# Patient Record
Sex: FEMALE | Race: WHITE
[De-identification: names, ages, dates, MRNs, and addresses within clinical notes are randomized per-mention and may not be internally consistent; named-entity substitution may affect disease eponyms.]

---

## 2014-07-07 VITALS — SYSTOLIC BLOOD PRESSURE: 140 MMHG | DIASTOLIC BLOOD PRESSURE: 77 MMHG

## 2017-09-20 ENCOUNTER — HOSPITAL ENCOUNTER (OUTPATIENT)
Dept: HOSPITAL 44 - INF | Age: 67
End: 2017-09-20
Attending: NURSE PRACTITIONER
Payer: MEDICARE

## 2017-09-20 DIAGNOSIS — M81.0: Primary | ICD-10-CM

## 2017-09-20 PROCEDURE — 96372 THER/PROPH/DIAG INJ SC/IM: CPT

## 2017-12-31 ENCOUNTER — HOSPITAL ENCOUNTER (EMERGENCY)
Dept: HOSPITAL 44 - ED | Age: 67
Discharge: HOME | End: 2017-12-31
Payer: MEDICARE

## 2017-12-31 VITALS — DIASTOLIC BLOOD PRESSURE: 80 MMHG | SYSTOLIC BLOOD PRESSURE: 136 MMHG

## 2017-12-31 DIAGNOSIS — J11.1: Primary | ICD-10-CM

## 2017-12-31 PROCEDURE — 87400 INFLUENZA A/B EACH AG IA: CPT

## 2017-12-31 PROCEDURE — 99282 EMERGENCY DEPT VISIT SF MDM: CPT

## 2017-12-31 NOTE — ED PHYSICIAN DOCUMENTATION
Upper Respiratory Symptoms





- HISTORIAN


Historian: patient





- HPI


Chief Complaint: Upper Respiratory Symptoms


Onset: days ago (christmas)


Associated Symptoms: chills, runny nose


Further Comments: yes (67 year old patient presents with complaint of ear aches 

and intermittent nausea since Christmas.  Patient reports grandchildren had 

"bronchitis". States Friday afternoon she began having cough, body aches and 

chills.)





- ROS


CONST/EYES: denies: weakness, eye redness, eye itching, other


CVS/RESP: none


LYMPH: denies: leg swelling, rash, swollen glands, ankle swelling, other


GI/: none


NEURO/PSYCH: denies: fainting, dizziness, confusion, anxiety, depression, other


MS/SKIN: denies: joint pain, muscle aches, rash, other





- PAST HX


Lung Disease: none


PE Risk Factors: hypertension


Allergies/Adverse Reactions: 


 Allergies











Allergy/AdvReac Type Severity Reaction Status Date / Time


 


No Known Allergies Allergy   Verified 12/31/17 11:09














Home Medications: 


 Ambulatory Orders











 Medication  Instructions  Recorded


 


Dexlansoprazole [Dexilant]  12/31/17














- SOCIAL HX


Smoking History: non-smoker





- FAMILY HX


Family History: none





- VITAL SIGNS


Vital Signs: 


 Vital Signs











Temp Pulse Resp BP Pulse Ox


 


 99 F   107 H  16   166/79   95 


 


 12/31/17 10:20  12/31/17 10:20  12/31/17 10:20  12/31/17 10:20  12/31/17 10:20














- REVIEWED ASSESSMENTS


Nursing Assessment  Reviewed: Yes


Vitals Reviewed: Yes





Progress





- Progress


Progress: 





Educated patient on cause of bronchits - viral illness.  Explained antibiotics 

would not help a viral illness. 





Will swab for influenza. 





Influenza B positive - reviewed discharge instructions and medications with 

patient, verbalized understanding. 





ED Results Lab/Radiology





- Lab Results


Lab Results: 


 Lab Results











  12/31/17





  11:05


 


Influenza A (Rapid)  Negative 





   (NEGATIVE) 


 


Influenza B (Rapid)  Positive  H 





   (NEGATIVE) 














- Orders


Orders: 


 ED Orders











 Category Date Time Status


 


 Place IV Lock 1T Care  12/31/17 10:33 Inactive


 


 INFLUENZA A&B Stat Lab  12/31/17 11:05 Completed


 


 0.9 % Sodium Chloride [Normal Saline] 1,000 ml Med  12/31/17 10:33 Discontinued





 IV NOW   


 


 Acetaminophen [Tylenol Extra Strength] Med  12/31/17 11:11 Discontinued





 1,000 mg PO NOW ONE   


 


 Benzonatate [Tessalon] Med  12/31/17 11:11 Discontinued





 200 mg PO NOW ONE   


 


 Ibuprofen [Advil] Med  12/31/17 11:11 Discontinued





 600 mg PO NOW ONE   


 


 Ondansetron HCl/Pf [Zofran 4 mg/2 ml] Med  12/31/17 10:33 Discontinued





 4 mg IVP NOW ONE   














Upper Respiratory Symptoms





- EXAM


General Appearance: mild distress


EENT: eyes nml inspection, nml ENT inspection, lids & conjunct. nml, PERRL, ear 

nml, rhinorrhea, pharynx nml, airway nml.  No: pain over sinuses, frontal, 

maxillary, ethmoid


Respiratory: no resp. distress, breath sounds nml, no pain on inspiration, 

speaks full sentences, no pleuritic chest pain


Abdomen: non-tender, no organomegaly, nml bowel sounds, no distention


CVS: reg rate & rhythm, heart sounds normal, equal pulses, no murmur, no gallop

, PMI nml, no JVD, no friction rub, 24


Skin: color nml, no rash, warm,dry


Extremities: non-tender, normal range of motion, no evidence of injury, no edema

, J, NP


Neuro/Psych: oriented x3, neuro intact, mood/affect nml, CN's nml as tested





Discharge


Clincal Impression: 


 Influenza B





Referrals: 


Cierra Moreno PRN [Primary Care Provider] - 2 Days


Additional Instructions: 


Pick  up your prescriptions and start them today.





A virus cannot be treated with antibiotics.





 Rest  Have plenty of sleep and rest. Stay away from others while you have a 

cold or flu.


 Take simple painkillers  Such as Tylenol or ibuprofen, to help relieve 

headaches, muscles aches and pains and fever.  


 Keep hydrated (drink plenty of fluids)  This will help keep your throat moist 

and replace fluid lost due to a fever and sweating. Plenty of water is best. 

Avoid caffeine and alcohol as they will make you more dehydrated.


 Eat soft food  If you have a sore throat soft foods are easier to swallow. 

Foods such as chicken soup may help a sore throat and reduce mucous (sticky 

fluid).


  an over the counter decongestant such as pseudoped, dayquil and Nyquil 

at your pharmacy. You may want to try Vicks rub on your chest and/or feet.  (

Caution: Dayquil and Nyquil contain 325mg of Tylenol/acetaminophen per 

tablespoon)





Cough drops as needed for cough and sore throat. 





Increase your fluid intake  juices, hot tea, non-caffeinated beverages





Vitamin C  may be helpful in decreasing the length of your cold.  





Use a humidifier in the room where you sleep.  You can also sit in a steam 

filled bathroom 1-2 times a day.  





Tylenol every 4 hours 650mg -1000mg (do not exceed 4000mg in 24 hours) as 

needed for fever, pain and body aches.  





Alternate with Ibuprofen





Ibuprofen 600-800mg every 6 hours as needed for fever, pain and body aches.





See your primary care doctor if your symptoms become worse or do not improve in 

the next 3-4 days.


Condition: Stable


Disposition: 01 HOME, SELF-CARE


Decision to Admit: NO


Decision Time: 11:18

## 2019-07-01 ENCOUNTER — HOSPITAL ENCOUNTER (EMERGENCY)
Dept: HOSPITAL 44 - ED | Age: 69
Discharge: HOME | End: 2019-07-01
Payer: MEDICARE

## 2019-07-01 VITALS
DIASTOLIC BLOOD PRESSURE: 97 MMHG | SYSTOLIC BLOOD PRESSURE: 155 MMHG | SYSTOLIC BLOOD PRESSURE: 155 MMHG | DIASTOLIC BLOOD PRESSURE: 97 MMHG

## 2019-07-01 DIAGNOSIS — K44.9: Primary | ICD-10-CM

## 2019-07-01 DIAGNOSIS — N20.0: ICD-10-CM

## 2019-07-01 LAB
BASOPHILS NFR BLD: 0.4 % (ref 0–1.5)
EGFR (NON-AFRICAN): > 60
MCV RBC AUTO: 90 FL (ref 80–100)
NEUTROPHILS #: 4.1 # K/UL (ref 1.4–7.7)

## 2019-07-01 PROCEDURE — 81002 URINALYSIS NONAUTO W/O SCOPE: CPT

## 2019-07-01 PROCEDURE — 96372 THER/PROPH/DIAG INJ SC/IM: CPT

## 2019-07-01 PROCEDURE — S1016 NON-PVC INTRAVENOUS ADMINIST: HCPCS

## 2019-07-01 PROCEDURE — 99283 EMERGENCY DEPT VISIT LOW MDM: CPT

## 2019-07-01 PROCEDURE — 85025 COMPLETE CBC W/AUTO DIFF WBC: CPT

## 2019-07-01 PROCEDURE — 99284 EMERGENCY DEPT VISIT MOD MDM: CPT

## 2019-07-01 PROCEDURE — 74176 CT ABD & PELVIS W/O CONTRAST: CPT

## 2019-07-01 PROCEDURE — 80053 COMPREHEN METABOLIC PANEL: CPT

## 2019-07-01 NOTE — ED PHYSICIAN DOCUMENTATION
Flank Pain





- HISTORIAN


Historian: patient





- HPI


Stated Complaint: L flank pain, Nausea


Chief Complaint: Flank Pain


Onset: days ago (2)


Duration: constant


Timing: worse


Context: denies: out of country travel, bad food, recent trauma


Severity: severe


Quality: pain


Associated Symptoms: nausea


Exacerbated by: nothing


Relieved by: nothing


Further Comments: yes (69 year old female patient presents with left flank pain 

x 2 days accompanied by nausea.  Patient reports recent UTI and remote history 

of kidney stone.  Reports pain radiating to "side.")





- ROS


CONST: recent illness (UTI)


GI/: none


CVS/RESP: none


EYES/ENT: none


MS/SKIN/LYMPH: none


NEURO/PSYCH: none





- SOCIAL HX


Smoking History: non-smoker





- FAMILY HX


Family History: denies: none





- PAST HX


Past History: kidney stones, other (GERD)


Other History: hypertension, other (hypothyroid, GERD)


Surgeries/Procedures: other (1970 - left ureter; stone removal.)


Allergies: NKDA





- VITAL SIGNS


Vital Signs: 





                                   Vital Signs











Temp Pulse Resp BP Pulse Ox


 


 98.5 F   74   16   197/95   95 


 


 07/01/19 17:41  07/01/19 17:41  07/01/19 17:41  07/01/19 17:41  07/01/19 17:41














- REVIEWED ASSESSMENTS


Nursing Assessment  Reviewed: Yes


Vitals Reviewed: Yes





Progress





- Progress


Progress: 





Patient states she feels better after toradol 





Reviewed CT results with patient and , FLomax started in ER. 





Reviewed discharge instructions, questions answered. 





ED Results Lab/Radiology





- Lab Results


Lab Results: 





                                   Lab Results











  07/01/19 07/01/19





  18:01 18:01


 


WBC    5.90 K/ul K/ul





    (4.00-12.00) 


 


RBC    4.59 M/ul M/ul





    (3.90-5.20) 


 


Hgb    13.8 g/dL g/dL





    (11.5-16.0) 


 


Hct    41.2 % %





    (34.5-46.5) 


 


MCV    90.0 fl fl





    (80.0-100.0) 


 


MCH    30.1 pg pg





    (28.0-34.0) 


 


MCHC    33.5 g/dL g/dL





    (30.0-36.0) 


 


RDW    13.6 % %





    (11.3-14.3) 


 


Plt Count    137 K/mm3 K/mm3





    (130-400) 


 


Neut % (Auto)    69.5 % %





    (39.0-79.0) 


 


Lymph % (Auto)    19.7 % %





    (16.0-50.0) 


 


Mono % (Auto)    8.0 % %





    (0.0-11.0) 


 


Eos % (Auto)    2.4 % %





    (0.0-6.8) 


 


Baso % (Auto)    0.4 % %





    (0.0-1.5) 


 


Neut # (Auto)    4.1 # k/uL # k/uL





    (1.4-7.7) 


 


Lymph # (Auto)    1.2 # k/uL # k/uL





    (0.6-4.0) 


 


Mono # (Auto)    0.5 # k/uL # k/uL





    (0.0-0.9) 


 


Eos # (Auto)    0.1 # k/uL # k/uL





    (0.0-0.6) 


 


Baso # (Auto)    0.0 # k/uL # k/uL





    (0.0-0.5) 


 


Sodium  139 mmol/L mmol/L  





   (137-145)  


 


Potassium  3.7 mmol/L mmol/L  





   (3.5-5.1)  


 


Chloride  103 mmol/L mmol/L  





   ()  


 


Carbon Dioxide  28 mmol/L mmol/L  





   (22-30)  


 


BUN  28 mg/dL H mg/dL  





   (7-17)  


 


Creatinine  0.79 mg/dL mg/dL  





   (0.52-1.04)  


 


Est GFR ( Amer)  > 60   





  (60 - ) 


 


Est GFR (Non-Af Amer)  > 60   





  (60 - ) 


 


Glucose  114 mg/dL H mg/dL  





   ()  


 


Calcium  9.1 mg/dL mg/dL  





   (8.4-10.2)  


 


Total Bilirubin  0.3 mg/dL mg/dL  





   (0.2-1.3)  


 


AST  40 U/L U/L  





   (15-46)  


 


ALT  30 U/L U/L  





   (13-69)  


 


Alkaline Phosphatase  77 U/L U/L  





   ()  


 


Total Protein  7.0 g/dL g/dL  





   (6.3-8.2)  


 


Albumin  4.4 g/dL g/dL  





   (3.5-5.0)  














- Radiology


Radiology Impressions: 





CT abdomen and pelvis without IV contrast 


Clinical history: Left flank pain and hematuria 





8 cm hiatal hernia. Most of the stomach is herniated into lower thorax. 1 cm 

cyst of the right lobe of the liver. Calcified gallstone. Extrarenal pelvis 

right and left. Left hydronephrosis and left hydroureter with about 4 x 6 mm 

calculus in the distal left ureter almost the left ureterovesical junction. 

Punctate 1 mm calcification in the right and left kidney. No bowel obstruction. 

Moderate diverticulosis of sigmoid colon. 


Impression: 


Large hiatal hernia 


1 cm cyst of the right lobe of the liver 


4 x 6 mm calculus almost of the left ureterovesical junction with moderate left 

hydronephrosis and left hydroureter 


Punctate 1 mm stones and in the right and left kidney 


Calcified gallstone 


Moderate diverticulosis of sigmoid colon


 


Electronically signed on Jul 1, 2019 8:05:12 PM CDT by:


Gautam Barrera





- Orders


Orders: 





                                    ED Orders











 Category Date Time Status


 


 CT ABD & PELVIS W/O CON Stat Exams  07/01/19 Taken


 


 CBC/PLATELET/DIFF Stat Lab  07/01/19 18:01 Completed


 


 CMP Stat Lab  07/01/19 18:01 Completed


 


 UA W/MICRO IF INDICATED Stat Lab  07/01/19 18:01 Ordered


 


 0.9 % Sodium Chloride [Normal Saline] 1,000 ml Med  07/01/19 18:01 Discontinued





 IV NOW   


 


 Ketorolac Tromethamine [Toradol] Med  07/01/19 18:01 Discontinued





 30 mg IVP NOW ONE   


 


 Ondansetron HCl/Pf [Zofran] Med  07/01/19 18:01 Discontinued





 4 mg IVP NOW ONE   


 


 Tamsulosin HCl [Flomax] Med  07/01/19 20:13 Discontinued





 0.4 mg PO NOW ONE   














Abdominal Pain Physical Exam





- Physical Exam


General Appearance: moderate distress


EENT: eye inspection normal, MELANIE


RESPIRATORY: no resp distress, chest non-tender, breath sounds normal


CVS: reg rate & rhythm, heart sounds normal, equal pulses, no murmur, no gallop,

 PMI nml, no JVD, no friction rub, 24


ABDOMEN: soft, no organomegaly, normal bowel sounds, no abdominal bruit, no 

distension


BACK: normal inspection, CVA tenderness (L)


SKIN: normal color, warm/dry, NR, INT, PAL, DR


EXTREMITIES: non-tender, normal range of motion, no evidence of injury, no 

edema, J, NP


NEURO: oriented X3, CN's nml as tested, motor nml, sensation nml


Vital Signs: 





                                   Vital Signs











Temp Pulse Resp BP Pulse Ox


 


 98.5 F   74   16   197/95   95 


 


 07/01/19 17:41  07/01/19 17:41  07/01/19 17:41  07/01/19 17:41  07/01/19 17:41














Discharge


Clincal Impression: 


 Renal calculus, left, Hiatal hernia





Prescriptions: 


Ondansetron HCl Rapdis [Zofran Odt] 4 mg PO Q6 PRN #15 tab


 PRN Reason: Nausea / Vomiting


Tamsulosin HCl [Flomax] 0.4 mg PO [id number] #10 cap.er.24h


Referrals: 


Cierra Moreno, PRN [Primary Care Provider] - 2 Days


Additional Instructions: 


 your prescription and start it TOMORROW. 





Increase your fluid intake to at least 64 oz of water a day





Strain all urine.





If the stone is passed, place it in a specimen cup and take it to your primary 

care physician for analysis.


Alternate with Ibuprofen 600-800mg three times a day with food as needed.  Do 

not take for more than 5 days in a row.


You may use Tylenol every 4hour as needed for pain.  Limit your dose to less 

than 4 G per day.  





Make a follow up appointment with HOLLY Moreno for Friday. 


Condition: Stable


Disposition: 01 HOME, SELF-CARE


Decision to Admit: NO


Decision Time: 20:49

## 2019-07-02 LAB
APPEARANCE UR: CLEAR
COLOR,URINE: YELLOW
PH UR STRIP: 6 [PH] (ref 5–8)
RBC UR QL: (no result)
UROBILINOGEN URINE: 0.2 EU (ref 0.2–1)

## 2019-07-02 NOTE — DIAGNOSTIC IMAGING REPORT
LOBO YANG (NP) - ER 

[facility]

[id number] [facility].[address]. [id number]

 

 

 

 

Report Submission Date: 2019 8:05:12 PM CDT

Patient       Study

Name:   NOMAN MORENO       Date:   2019 7:07:12 PM CDT

MRN:   [id number]       Modality Type:   CT\SR

Gender:   F       Description:   

:   54       Institution:   [facility]

Physician:   LOBO YANG (NP) - ER

     Accession:    [id number]

 

 

CT abdomen and pelvis without IV contrast 

Clinical history: Left flank pain and hematuria 



8 cm hiatal hernia. Most of the stomach is herniated into lower thorax. 1 cm 
cyst of the right lobe of the liver. Calcified gallstone. Extrarenal pelvis 
right and left. Left hydronephrosis and left hydroureter with about 4 x 6 mm 
calculus in the distal left ureter almost the left ureterovesical junction. 
Punctate 1 mm calcification in the right and left kidney. No bowel obstruction. 
Moderate diverticulosis of sigmoid colon. 

Impression: 

Large hiatal hernia 

1 cm cyst of the right lobe of the liver 

4 x 6 mm calculus almost of the left ureterovesical junction with moderate left 
hydronephrosis and left hydroureter 

Punctate 1 mm stones and in the right and left kidney 

Calcified gallstone 

Moderate diverticulosis of sigmoid colon

 

Electronically signed on 2019 8:05:12 PM CDT by:

Gautam OHARA

## 2020-01-09 ENCOUNTER — HOSPITAL ENCOUNTER (OUTPATIENT)
Dept: HOSPITAL 44 - INF | Age: 70
End: 2020-01-09
Attending: NURSE PRACTITIONER
Payer: MEDICARE

## 2020-01-09 DIAGNOSIS — M81.0: Primary | ICD-10-CM
